# Patient Record
Sex: FEMALE | Race: OTHER | Employment: PART TIME | URBAN - NONMETROPOLITAN AREA
[De-identification: names, ages, dates, MRNs, and addresses within clinical notes are randomized per-mention and may not be internally consistent; named-entity substitution may affect disease eponyms.]

---

## 2017-10-02 ENCOUNTER — OFFICE VISIT (OUTPATIENT)
Dept: PRIMARY CARE CLINIC | Age: 22
End: 2017-10-02
Payer: COMMERCIAL

## 2017-10-02 VITALS
HEIGHT: 63 IN | DIASTOLIC BLOOD PRESSURE: 70 MMHG | BODY MASS INDEX: 22.32 KG/M2 | OXYGEN SATURATION: 95 % | WEIGHT: 126 LBS | HEART RATE: 59 BPM | TEMPERATURE: 99.2 F | SYSTOLIC BLOOD PRESSURE: 118 MMHG

## 2017-10-02 DIAGNOSIS — N64.4 BREAST TENDERNESS IN FEMALE: ICD-10-CM

## 2017-10-02 DIAGNOSIS — N92.6 ABNORMAL MENSES: ICD-10-CM

## 2017-10-02 DIAGNOSIS — R51.9 FREQUENT HEADACHES: Primary | ICD-10-CM

## 2017-10-02 DIAGNOSIS — Z23 NEED FOR INFLUENZA VACCINATION: ICD-10-CM

## 2017-10-02 DIAGNOSIS — N92.6 ABNORMAL MENSES: Primary | ICD-10-CM

## 2017-10-02 DIAGNOSIS — R53.83 FATIGUE, UNSPECIFIED TYPE: ICD-10-CM

## 2017-10-02 DIAGNOSIS — R11.0 NAUSEA: ICD-10-CM

## 2017-10-02 LAB
BILIRUBIN, POC: NORMAL
BLOOD URINE, POC: NORMAL
CLARITY, POC: CLEAR
COLOR, POC: YELLOW
CONTROL: NORMAL
GLUCOSE URINE, POC: NORMAL
KETONES, POC: NORMAL
LEUKOCYTE EST, POC: NORMAL
NITRITE, POC: NORMAL
PH, POC: 7
PREGNANCY TEST URINE, POC: NEGATIVE
PROTEIN, POC: NORMAL
SPECIFIC GRAVITY, POC: 1.01
UROBILINOGEN, POC: 0.2

## 2017-10-02 PROCEDURE — 90471 IMMUNIZATION ADMIN: CPT | Performed by: PEDIATRICS

## 2017-10-02 PROCEDURE — 90686 IIV4 VACC NO PRSV 0.5 ML IM: CPT | Performed by: PEDIATRICS

## 2017-10-02 PROCEDURE — 99204 OFFICE O/P NEW MOD 45 MIN: CPT | Performed by: PEDIATRICS

## 2017-10-02 PROCEDURE — 81025 URINE PREGNANCY TEST: CPT | Performed by: PEDIATRICS

## 2017-10-02 PROCEDURE — 81002 URINALYSIS NONAUTO W/O SCOPE: CPT | Performed by: PEDIATRICS

## 2017-10-02 ASSESSMENT — ENCOUNTER SYMPTOMS
DIARRHEA: 0
SINUS PRESSURE: 0
ABDOMINAL PAIN: 0
NAUSEA: 1
SHORTNESS OF BREATH: 0
BACK PAIN: 0
CHEST TIGHTNESS: 0
TROUBLE SWALLOWING: 0
VOMITING: 0

## 2017-10-02 NOTE — PROGRESS NOTES
[facility], [address]  Phone [phone]   Fax [phone]      OFFICE VISIT: 10/2/2017    Tyler Barrow- : 1995      HPI  Reason For Visit:  Kary Martines is a 25 y.o. here as new patient to establish care. Health Maintenance utd, flu vaccine- would like to have today. Date of Most Recent Physical:  2017, never had pap smear  Medicare Health Risk Assessment Form completed and in chart?  na    The patient presents today for new patient visit to establish care. migraine type headaches daily last month, currently not as severe. She was having migraines daily for a month. She has occasional nausea  This was back in august.  She has a headache about every other day  No double vision, but some difficulty focusing. Last period was 17  Typical period 30+ days. breast soreness and growing. No leaking from nipples. Not a runner    took pregnancy test and was negative and period followed. She had a light period that was light and spotty, then she had a couple days that were more normal  She does not take any medications     She has never had an mri           height is 5' 3\" (1.6 m) and weight is 126 lb (57.2 kg). Her temporal temperature is 99.2 °F (37.3 °C). Her blood pressure is 118/70 and her pulse is 59. Her oxygen saturation is 95%. Body mass index is 22.32 kg/(m^2). Results for orders placed or performed in visit on 10/02/17   POCT Urinalysis no Micro   Result Value Ref Range    Color, UA yellow     Clarity, UA clear     Glucose, UA POC neg     Bilirubin, UA neg     Ketones, UA neg     Spec Grav, UA 1.015     Blood, UA POC neg     pH, UA 7.0     Protein, UA POC neg     Urobilinogen, UA 0.2     Leukocytes, UA neg     Nitrite, UA neg    POCT urine pregnancy   Result Value Ref Range    Preg Test, Ur negative     Control         I have reviewed the following with the Ms. Maxwell   Lab Review No results found for any previous visit.   Copies of these are in the chart. No current outpatient prescriptions on file. No current facility-administered medications for this visit. Allergies: Review of patient's allergies indicates no known allergies. Past Medical History:   Diagnosis Date    GERD (gastroesophageal reflux disease)        History reviewed. No pertinent surgical history. Social History   Substance Use Topics    Smoking status: Never Smoker    Smokeless tobacco: Never Used    Alcohol use No       Review of Systems   Constitutional: Negative for appetite change and fever. HENT: Negative for congestion, ear pain, sinus pressure and trouble swallowing. Eyes: Negative for visual disturbance. Respiratory: Negative for chest tightness and shortness of breath. Cardiovascular: Negative for chest pain. Gastrointestinal: Positive for nausea. Negative for abdominal pain, diarrhea and vomiting. Endocrine: Negative for cold intolerance. Genitourinary: Negative for difficulty urinating and urgency. Musculoskeletal: Negative for back pain and neck pain. Skin: Negative for wound. Neurological: Positive for headaches. Negative for weakness and light-headedness. Psychiatric/Behavioral: Negative for sleep disturbance. Physical Exam   Constitutional: She is oriented to person, place, and time. She appears well-developed and well-nourished. She is cooperative. Non-toxic appearance. No distress. Body habitus is normal   HENT:   Head: Normocephalic and atraumatic. Right Ear: Hearing, tympanic membrane, external ear and ear canal normal.   Left Ear: Hearing, tympanic membrane, external ear and ear canal normal.   Nose: Nose normal.   Mouth/Throat: Oropharynx is clear and moist and mucous membranes are normal.   Eyes: Conjunctivae, EOM and lids are normal. Pupils are equal, round, and reactive to light. Neck: Phonation normal. Neck supple. No JVD present. Carotid bruit is not present. No thyromegaly present.

## 2017-10-02 NOTE — PROGRESS NOTES
After obtaining consent from EMMY Jade DO, gave patient fluzone injection in Left deltoid, patient tolerated well. Medication was not supplied by the patient.

## 2017-10-05 DIAGNOSIS — N64.4 BREAST TENDERNESS IN FEMALE: ICD-10-CM

## 2017-10-05 DIAGNOSIS — R53.83 FATIGUE, UNSPECIFIED TYPE: ICD-10-CM

## 2017-10-05 DIAGNOSIS — N92.6 ABNORMAL MENSES: ICD-10-CM

## 2017-10-05 LAB
FOLLICLE STIMULATING HORMONE: 6.3 MIU/ML
LUTEINIZING HORMONE: 4.5 MIU/ML
T4 FREE: 1.2 NG/DL (ref 0.9–1.7)
TSH SERPL DL<=0.05 MIU/L-ACNC: 1.3 UIU/ML (ref 0.27–4.2)

## 2017-10-07 LAB — PROLACTIN: 8.1 NG/ML (ref 2.8–26)

## 2017-10-09 ENCOUNTER — HOSPITAL ENCOUNTER (OUTPATIENT)
Dept: MRI IMAGING | Age: 22
Discharge: HOME | End: 2017-10-09
Payer: COMMERCIAL

## 2017-10-09 ENCOUNTER — PATIENT MESSAGE (OUTPATIENT)
Dept: PRIMARY CARE CLINIC | Age: 22
End: 2017-10-09

## 2017-10-09 DIAGNOSIS — R53.83 FATIGUE, UNSPECIFIED TYPE: ICD-10-CM

## 2017-10-09 DIAGNOSIS — E23.7 ABNORMALITY OF PITUITARY GLAND (HCC): Primary | ICD-10-CM

## 2017-10-09 DIAGNOSIS — N64.4 BREAST TENDERNESS IN FEMALE: ICD-10-CM

## 2017-10-09 DIAGNOSIS — N92.6 ABNORMAL MENSES: ICD-10-CM

## 2017-10-09 DIAGNOSIS — R51.9 FREQUENT HEADACHES: ICD-10-CM

## 2017-10-09 PROCEDURE — 70551 MRI BRAIN STEM W/O DYE: CPT

## 2017-10-10 ENCOUNTER — PATIENT MESSAGE (OUTPATIENT)
Dept: PRIMARY CARE CLINIC | Age: 22
End: 2017-10-10

## 2017-10-10 ENCOUNTER — TELEPHONE (OUTPATIENT)
Dept: PRIMARY CARE CLINIC | Age: 22
End: 2017-10-10

## 2017-10-10 NOTE — TELEPHONE ENCOUNTER
From: Jeremy Pryor  To: Daniela Roca DO  Sent: 10/10/2017 4:05 PM CDT  Subject: Test Results Question    Okay thank you.   ----- Message -----  From: Abebamohamud Floyds  Sent: 10/10/17, 4:59 PM  To: Pradeep Maxwell  Subject: RE: Test Results Question    Okay, I have put in the referral to their office for you. They will be contacting you for an appoitment, you are going to see Dr. eBrta Chaudhari. Let us know if you need anything else. Thanks,     Kira Allan MA    ----- Message -----   From: Dominique Maxwell   Sent: 10/10/2017 12:02 PM CDT   To: Fredrick Barger DO  Subject: RE: Test Results Question    Okay, I would like to get a neurosurgical referral. What is the process for that?     ----- Message -----  From: Zaheer Del Castillo  Sent: 10/10/17, 11:31 AM  To: Pradeep Maxwell  Subject: RE: Test Results Question    Notes Recorded by Daniela Roca DO on 10/9/2017 at 8:10 PM CDT  MRI of the pituitary gland does show a slightly vague abnormality in the posterior part of the pituitary gland. The radiologist was unclear as to whether this may be a small adenoma or just an artifact which is not important. Options:  -We can do a repeat MRI with contrast for better enhancement of this area. -We could hold off for now and evaluate further if any symptoms change, or if prolactin levels increase.  -We could put in a neurosurgical referral for their expert opinion as well    Let us know what you want to do further. Thanks,     Kira Allan MA    ----- Message -----   From: Dominique Maxwell   Sent: 10/9/2017 8:31 PM CDT   To: Frderick Barger DO  Subject: Test Results Question    I have a question about MRI BRAIN WO CONTRAST resulted on 10/9/17, 9:42 AM.    So, what do you recommend and where do we go from here? I'm a little confused.      Thank you,     Ysabel Verduzco

## 2017-10-10 NOTE — TELEPHONE ENCOUNTER
----- Message from [address], DO sent at 10/8/2017  8:26 AM CDT -----  Prolactin level is normal.  This is excellent news and indicates that everything should be okay. If there are any further problems, or if this continues, please let me know and we can do more studies. I'm very pleased, however, with these results. This indicates a very low likelihood of any pituitary process occurring. If you notice any redness, firmness, lumps or bumps of the breast tissue, please let me know. Also if you experience any swelling of lymph nodes under the armpit area, I will need to know.

## 2017-10-12 ENCOUNTER — TELEPHONE (OUTPATIENT)
Dept: NEUROSURGERY | Age: 22
End: 2017-10-12

## 2017-10-12 NOTE — TELEPHONE ENCOUNTER
Neurosurgical pre apt questionnaire     Referring physician? LIZETTE    Who is completing questionnaire? PATIENT     Has the pt had any previous spinal/brain surgeries? NO    If yes, Where was the surgery preformed? What was the surgery? Who was the surgeon? When was this surgery? Why is the pt not following up with previous surgeon? Is this a second opinion? Was a MRI preformed? YES    If not, Is there any reason a MRI cannot be performed? Is there metal anywhere in the body? If yes,  Where was the MRI preformed? [facility]   What part of the body? BRAIN   When was it preformed? 10/9/17      Note:If  was not the facility that performed the study,    the disc will need to be brought appoint. Physical Therapy? If yes, where was PT completed? What is the duration of therapy? Pain Management? NO   If yes, where was pain mgt therapy? Is the patient still under contract with pain mgt? Who is the pain mgt physician? Is the pt currently taking anything for pain control? Employment Status ? EMPLOYED   What type of employment? [employer]   Has the patient missed work due to pain? YES A COUPLE DAYS   If unemployed, how long? Are you on disability? Symptoms?         DAILY MIGRAINES, SHARP PAIN FROM HEAD INTO EYE, SHOOTING PAIN

## 2017-10-24 ENCOUNTER — OFFICE VISIT (OUTPATIENT)
Dept: NEUROSURGERY | Age: 22
End: 2017-10-24
Payer: COMMERCIAL

## 2017-10-24 VITALS
HEIGHT: 64 IN | DIASTOLIC BLOOD PRESSURE: 74 MMHG | BODY MASS INDEX: 21.68 KG/M2 | HEART RATE: 84 BPM | OXYGEN SATURATION: 100 % | WEIGHT: 127 LBS | SYSTOLIC BLOOD PRESSURE: 122 MMHG

## 2017-10-24 DIAGNOSIS — G43.719 INTRACTABLE CHRONIC MIGRAINE WITHOUT AURA AND WITHOUT STATUS MIGRAINOSUS: Primary | ICD-10-CM

## 2017-10-24 PROCEDURE — 99203 OFFICE O/P NEW LOW 30 MIN: CPT | Performed by: NURSE PRACTITIONER

## 2017-10-24 ASSESSMENT — ENCOUNTER SYMPTOMS
BLURRED VISION: 1
DIARRHEA: 0
BLOOD IN STOOL: 0
DOUBLE VISION: 0
ABDOMINAL PAIN: 0
VOMITING: 1
RESPIRATORY NEGATIVE: 1
NAUSEA: 1
SINUS PAIN: 0
EYE DISCHARGE: 0
SORE THROAT: 0
EYE PAIN: 0
STRIDOR: 0
BACK PAIN: 0
CONSTIPATION: 0
PHOTOPHOBIA: 1
HEARTBURN: 0
EYE REDNESS: 0

## 2017-10-24 NOTE — PROGRESS NOTES
[facility] Neurosurgery  Office Visit    Patient:   Yesenia Self  MR#:    [id number]      YOB: 1995  Date of Evaluation:  10/24/2017  Time of Note:                          9:05 AM  Primary/Referring Physician:  Munir Mena DO    Note Author:   Julio Hernandez CNP    Chief Complaint   Patient presents with    Other     migraines       HISTORY OF PRESENT ILLNESS:      Yesenia Self is a 25 y.o. female who presents with migraines and an abnormal MRI. She states that she has had migraines all through growing up but they have gotten worse since the middle of August.  They start in the occipital region of her head and move to the right side of her head and behind her eyes. She was seen by her PCP and a MRI of the brain was ordered, came back abnormal and was ultimately referred to us. Of note she does not use tobacco and does not take blood thinning medications. Past Medical History:   Diagnosis Date    GERD (gastroesophageal reflux disease)     Headache        History reviewed. No pertinent surgical history. Current Outpatient Prescriptions   Medication Sig Dispense Refill    Prenat-Fe Carbonyl-FA-Omega 3 (ONE-A-DAY WOMENS PRENATAL 1 PO) Take by mouth Daily       No current facility-administered medications for this visit. Allergies:  Review of patient's allergies indicates no known allergies. Social History:   History   Smoking Status    Never Smoker   Smokeless Tobacco    Never Used     History   Alcohol Use No         Family History:   Family History   Problem Relation Age of Onset    No Known Problems Mother     No Known Problems Father        REVIEW OF SYSTEMS:  Review of Systems   Constitutional: Negative. HENT: Positive for tinnitus. Negative for congestion, ear discharge, ear pain, hearing loss, nosebleeds, sinus pain and sore throat. Eyes: Positive for blurred vision and photophobia. Negative for double vision, pain, discharge and redness. Respiratory: Negative. Negative for stridor. Cardiovascular: Negative. Gastrointestinal: Positive for nausea and vomiting. Negative for abdominal pain, blood in stool, constipation, diarrhea, heartburn and melena. Genitourinary: Positive for frequency. Negative for dysuria, flank pain, hematuria and urgency. Musculoskeletal: Positive for myalgias. Negative for back pain, falls, joint pain and neck pain. Skin: Negative. Neurological: Positive for headaches. Negative for dizziness, tingling, tremors, sensory change, speech change, focal weakness, seizures and loss of consciousness. Endo/Heme/Allergies: Negative. Psychiatric/Behavioral: Negative. PHYSICAL EXAM:  Vitals:    10/24/17 0830   BP: 122/74   Pulse: 84   SpO2: 100%     Constitutional: appears well-developed and well-nourished. Eyes - conjunctiva normal.  Pupils react to light  Ear, nose, throat -hearing intact to finger rub, No scars, masses, or lesions over external nose or ears, no atrophy of tongue  Neck-symmetric, no masses noted, no jugular vein distension  Respiration- chest wall appears symmetric, good expansion, normal effort without use of accessory muscles  Musculoskeletal - no significant wasting of muscles noted, no bony deformities, gait no gross ataxia  Extremities-no clubbing, cyanosis or edema  Skin - warm, dry, and intact. No rash, erythema, or pallor. Psychiatric - mood, affect, and behavior appear normal.     Neurologic Examinaiton  Awake, Alert and oriented x 3  Normal speech pattern, following commands  Motor 5/5 all extremities  No deficits to light touch or pinprick sensation  Reflexes are 2+ and symmetric  No clonus or Hoffmans sign  Normal Gait pattern      DATA and IMAGING:    Nursing/pcp notes, imaging, labs, and vitals reviewed.      PT,OT and/or speech notes reviewed    No results found for: WBC, HGB, HCT, MCV, PLTNo results found for: NA, K, CL, CO2, BUN, CREATININE, GLUCOSE, CALCIUM, PROT, LABALBU, BILITOT, ALKPHOS, AST, ALT, LABGLOM, GFRAA, AGRATIO, GLOBNo results found for: INR, PROTIME      Narrative   Examination. MRI BRAIN WO CONTRAST   History: Frequent headaches and generalized weakness. The multiplanar multisequence MR imaging of the brain is performed   without intravenous contrast enhancement. There is no previous study for comparison. The diffusion-weighted imaging sequence including the ADC map show no   areas of restricted diffusion. The inferior pole of the cerebellar tonsils is marginally below the   plane of foramen magnum and probably represent a normal variation. The   remaining posterior fossa structures including the fourth ventricle   are normal.   There is no evidence of a mass or midline shift. The ventricles, the   cistern on the cortical sulci are normal.   There is an ill-defined low-density area along the posterior inferior   part of the pituitary gland. The corpus callosum, the brainstem and   the remaining cerebellum appear normal. The orbits bilaterally, the   optic nerves and optic chiasm appear normal. 7th and 8th nerve bundles   bilaterally appears normal and symmetrical.   The FLAIR sequence images show no signal abnormalities in the white   matter. The gradient recalled imaging sequence show normal flow in the   visualized intracranial vessels. There is no evidence of intracranial   hemorrhage or hematoma. The visualized paranasal sinuses and mastoid air cells bilaterally   appear normal and symmetrical.       Impression   An ill-defined small low-density area in the posterior   medial part of the pituitary gland may represent a microadenoma are an   imaging artifact. If clinically warranted, please obtain contrast   enhancement and mild imaging of the pituitary gland. A mild tonsillar ectopy may represent a normal variation. No other intracranial abnormality noted.    Signed by Dr Constantin Blood on 10/9/2017 8:42 AM     I have personally reviewed these images and my interpretation is: This is an unremarkable MRI of the brain. There is a normal bright spot on the pituitary gland, this is a normal finding. ASSESSMENT:    This is a 25 y.o. female who presents with a history of migraines. Her MRI of the brain is unremarkable. ICD-10-CM ICD-9-CM    1. Intractable chronic migraine without aura and without status migrainosus G43.719 346.71 Ambulatory referral to Neurology       PLAN:  -Explained and discussed the results/findings of the MRI brain. We explained that the spot that was read to be \"bright\" is a normal bright spot that typically appears on the posterior aspect of the pituitary gland, this is a normal finding.    -Referral to Dr. Shi Montalvo.  Teodora Schwab for management of migraines  -Follow up as needed       Chano Loving, CNP

## 2017-12-12 ENCOUNTER — OFFICE VISIT (OUTPATIENT)
Dept: NEUROSURGERY | Age: 22
End: 2017-12-12
Payer: COMMERCIAL

## 2017-12-12 VITALS
TEMPERATURE: 98.2 F | OXYGEN SATURATION: 100 % | SYSTOLIC BLOOD PRESSURE: 131 MMHG | HEIGHT: 64 IN | DIASTOLIC BLOOD PRESSURE: 70 MMHG | WEIGHT: 127 LBS | RESPIRATION RATE: 18 BRPM | BODY MASS INDEX: 21.68 KG/M2 | HEART RATE: 92 BPM

## 2017-12-12 DIAGNOSIS — G43.719 INTRACTABLE CHRONIC MIGRAINE WITHOUT AURA AND WITHOUT STATUS MIGRAINOSUS: Primary | ICD-10-CM

## 2017-12-12 PROCEDURE — 99204 OFFICE O/P NEW MOD 45 MIN: CPT | Performed by: PSYCHIATRY & NEUROLOGY

## 2017-12-12 RX ORDER — RIZATRIPTAN BENZOATE 10 MG/1
10 TABLET ORAL
Qty: 9 TABLET | Refills: 5 | Status: SHIPPED | OUTPATIENT
Start: 2017-12-12 | End: 2018-03-13

## 2017-12-12 RX ORDER — TOPIRAMATE 50 MG/1
50 TABLET, FILM COATED ORAL 2 TIMES DAILY
Qty: 60 TABLET | Refills: 5 | Status: SHIPPED | OUTPATIENT
Start: 2017-12-12 | End: 2018-03-13

## 2017-12-12 NOTE — PROGRESS NOTES
[address] [facility] Neurology Office Note      Patient:   Caity Lancaster  MR#:    [id number]  Account Number:                         YOB: 1995  Date of Evaluation:  12/12/2017  Time of Note:                          11:06 AM  Primary/Referring Physician:  Kelly Nicole DO   Consulting Physician:  Jim Crandall D.O.    NEW PATIENT CONSULTATION    Chief Complaint   Patient presents with    Migraine     Patient presents for intractable chronic migraine without aura and without status migrainosus. HISTORY OF PRESENT ILLNESS    Caity Lancaster is a 25y.o. year old female here for headaches. Present for quite sometime, worsening over the last 6 months, much more frequent. Pain is typically posterior, will migrate forward, notes some tingling in the scalp at times, some photophobia, some nausea, some blurred vision. Frequency is almost daily. Does take OTC pain medications daily. MRI brain with questionable pituitary gland abnormality, mild tonsillar ectopy noted. Seen by neurosurgery and not felt to be anything significant. Past Medical History:   Diagnosis Date    GERD (gastroesophageal reflux disease)     Headache        History reviewed. No pertinent surgical history. Family History   Problem Relation Age of Onset    No Known Problems Mother     No Known Problems Father        Social History     Social History    Marital status:      Spouse name: N/A    Number of children: N/A    Years of education: N/A     Occupational History    Not on file.      Social History Main Topics    Smoking status: Never Smoker    Smokeless tobacco: Never Used    Alcohol use No    Drug use: No    Sexual activity: Yes     Partners: Male     Other Topics Concern    Not on file     Social History Narrative    No narrative on file       Current Outpatient Prescriptions   Medication Sig Dispense Refill    Prenat-Fe Carbonyl-FA-Omega 3 (ONE-A-DAY WOMENS PRENATAL 1 PO) Take by mouth Daily       No marked  Allergic/Immunologic:[] Environmental Allergies [] Food Allergies [] Immunocompromised state  [x] Denies all unless marked    PHYSICAL EXAM  /70   Pulse 92   Temp 98.2 °F (36.8 °C)   Resp 18   Ht 5' 4\" (1.626 m)   Wt 127 lb (57.6 kg)   SpO2 100%   BMI 21.80 kg/m²     Constitutional - No acute distress    HEENT- Conjunctiva normal.  No scars, masses, or lesions over external nose or ears  Musculoskeletal - No significant wasting of muscles noted, no bony deformities  Extremities - No clubbing, cyanosis or edema  Skin - Warm, dry, and intact. No rash, erythema, or pallor  Psychiatric - Mood, affect, and behavior appear normal      NEUROLOGICAL EXAM    Mental status   [x]Awake, alert, oriented   [x]Affect attention and concentration appear appropriate  [x]Recent and remote memory appears unremarkable  [x]Speech normal without dysarthria or aphasia, comprehension and repetition intact. COMMENTS:    Cranial Nerves [x]No VF deficit to confrontation,  no papilledema on fundoscopic exam.  [x]PERRLA, EOMI, no nystagmus, conjugate eye movements, no ptosis  [x]Face symmetric  [x]Facial sensation intact  [x]Tongue midline no atrophy or fasciculations present  [x]Palate midline, hearing to finger rub normal bilaterally  [x]Shoulder shrug and SCM testing normal bilaterally  COMMENTS:   Motor   [x]5/5 strength x 4 extremities  [x]Normal bulk and tone  [x]No tremor present  [x]No rigidity or bradykinesia noted  COMMENTS:   Sensory  [x]Sensation intact to light touch, pin prick, vibration, and proprioception BLE  [x]Sensation intact to light touch, pin prick, vibration, and proprioception BUE  COMMENTS:   Coordination [x]FTN normal bilaterally   [x]HTS normal bilaterally  [x]MEGHANA normal bilaterally.    COMMENTS:   Reflexes  [x]Symmetric and non-pathological  [x]Toes down going bilaterally  [x]No clonus present  COMMENTS:   Gait                  [x]Normal steady gait    []Ataxic    []Spastic     []Magnetic 10/9/2017 8:42 AM      ASSESSMENT:    Yesenia Self is a 25y.o. year old female here for headaches. Suspect migraine given history. 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 Recent pituitary labs were normal. Neurosurgery has evaluated. PLAN:  1. Topamax titrate to 50 mg bid, side effects including renal stones discussed. 2.  Maxalt prn.   3.  Limit OTC pain medications  4. Headache log  5. Repeat MRI in 4-6 months to reassess pituitary and mild chiari.       Alvaro Redding DO  Board Certified Neurology

## 2017-12-12 NOTE — PROGRESS NOTES
[facility] Neurology Office Note      Patient:   Caity Lancaster  MR#:    [id number]  Account Number:                         YOB: 1995  Date of Evaluation:  12/12/2017  Time of Note:                          10:58 AM  Primary/Referring Physician:  Kelly Nicole DO   Consulting Physician:  Jim Crandall D.O.    NEW PATIENT CONSULTATION    OR    FOLLOW UP    Chief Complaint   Patient presents with    Migraine     Patient presents for intractable chronic migraine without aura and without status migrainosus. HISTORY OF PRESENT ILLNESS    Caity Lancaster is a 25y.o. year old female here for ***    Past Medical History:   Diagnosis Date    GERD (gastroesophageal reflux disease)     Headache        No past surgical history on file. Family History   Problem Relation Age of Onset    No Known Problems Mother     No Known Problems Father        Social History     Social History    Marital status:      Spouse name: N/A    Number of children: N/A    Years of education: N/A     Occupational History    Not on file. Social History Main Topics    Smoking status: Never Smoker    Smokeless tobacco: Never Used    Alcohol use No    Drug use: No    Sexual activity: Yes     Partners: Male     Other Topics Concern    Not on file     Social History Narrative    No narrative on file       Current Outpatient Prescriptions   Medication Sig Dispense Refill    Prenat-Fe Carbonyl-FA-Omega 3 (ONE-A-DAY WOMENS PRENATAL 1 PO) Take by mouth Daily       No current facility-administered medications for this visit.         No Known Allergies      REVIEW OF SYSTEMS    Constitutional: []Fever []Sweats []Chills [] Recent Injury []Fatigue  [x] Denies all unless marked  HEENT:[x]Headache  [] Head Injury  [] Sore Throat  [x] Ear Pain  [x]Dizziness [] Hearing Loss []Trouble Swallowing []Voice Change  [] Eye Pain  [] Eye Injection []Visual Disturbance  [] Ptosis  [] Tinnitus [] Denies all unless marked  Spine: use of accessory muscles  Musculoskeletal  No significant wasting of muscles noted, no bony deformities  Extremities - No clubbing, cyanosis or edema  Skin  Warm, dry, and intact. No rash, erythema, or pallor  Psychiatric  Mood, affect, and behavior appear normal      NEUROLOGICAL EXAM    Mental status   [x]Awake, alert, oriented   [x]Affect attention and concentration appear appropriate  [x]Recent and remote memory appears unremarkable  [x]Speech normal without dysarthria or aphasia, comprehension and repetition intact. COMMENTS:***    Cranial Nerves [x]No VF deficit to confrontation,  no papilledema on fundoscopic exam.  [x]PERRLA, EOMI, no nystagmus, conjugate eye movements, no ptosis  [x]Face symmetric  [x]Facial sensation intact  [x]Tongue midline no atrophy or fasciculations present  [x]Palate midline, hearing to finger rub normal bilaterally  [x]Shoulder shrug and SCM testing normal bilaterally  COMMENTS:***   Motor   [x]5/5 strength x 4 extremities  [x]Normal bulk and tone  [x]No tremor present  [x]No rigidity or bradykinesia noted  COMMENTS:***   Sensory  [x]Sensation intact to light touch, pin prick, vibration, and proprioception BLE  [x]Sensation intact to light touch, pin prick, vibration, and proprioception BUE  COMMENTS:***   Coordination [x]FTN normal bilaterally   [x]HTS normal bilaterally  [x]MEGHANA normal bilaterally.    COMMENTS:***   Reflexes  [x]Symmetric and non-pathological  [x]Toes down going bilaterally  [x]No clonus present  COMMENTS:***   Gait                  [x]Normal steady gait    []Ataxic    []Spastic     []Magnetic     []Shuffling  COMMENTS:***       LABS RECORD AND IMAGING REVIEW (As below and per HPI)    No results found for: [id number]  No results found for: WBC, HGB, HCT, MCV, PLT  No results found for: NA, K, CL, CO2, BUN, CREATININE, GLUCOSE, CALCIUM, PROT, LABALBU, BILITOT, ALKPHOS, AST, ALT, LABGLOM, GFRAA, AGRATIO, GLOB    Mri Brain Wo Contrast    Result Date:

## 2017-12-14 ENCOUNTER — OFFICE VISIT (OUTPATIENT)
Dept: PRIMARY CARE CLINIC | Age: 22
End: 2017-12-14
Payer: COMMERCIAL

## 2017-12-14 VITALS
OXYGEN SATURATION: 98 % | HEIGHT: 64 IN | SYSTOLIC BLOOD PRESSURE: 98 MMHG | TEMPERATURE: 98 F | WEIGHT: 129 LBS | DIASTOLIC BLOOD PRESSURE: 66 MMHG | HEART RATE: 79 BPM | BODY MASS INDEX: 22.02 KG/M2

## 2017-12-14 DIAGNOSIS — N64.4 BREAST TENDERNESS: ICD-10-CM

## 2017-12-14 DIAGNOSIS — Z02.89 ENCOUNTER FOR PHYSICAL EXAMINATION RELATED TO EMPLOYMENT: Primary | ICD-10-CM

## 2017-12-14 DIAGNOSIS — N92.6 IRREGULAR PERIODS: ICD-10-CM

## 2017-12-14 PROCEDURE — 99213 OFFICE O/P EST LOW 20 MIN: CPT | Performed by: NURSE PRACTITIONER

## 2017-12-14 ASSESSMENT — ENCOUNTER SYMPTOMS
RHINORRHEA: 0
EYE REDNESS: 0
CONSTIPATION: 0
SORE THROAT: 0
ABDOMINAL PAIN: 0
TROUBLE SWALLOWING: 0
EYE DISCHARGE: 0
COUGH: 0
BLOOD IN STOOL: 0
WHEEZING: 0
DIARRHEA: 0

## 2017-12-14 NOTE — PROGRESS NOTES
Eyes: Negative for discharge and redness. Respiratory: Negative for cough and wheezing. Cardiovascular: Negative for chest pain. Gastrointestinal: Negative for abdominal pain, blood in stool, constipation and diarrhea. Genitourinary: Positive for menstrual problem. Negative for dysuria. Skin: Negative for rash. Neurological: Negative for weakness. Hematological: Negative for adenopathy. Objective:     Physical Exam   Constitutional: She is oriented to person, place, and time. She appears well-developed and well-nourished. HENT:   Head: Normocephalic. Right Ear: Tympanic membrane normal.   Left Ear: Tympanic membrane normal.   Eyes: Conjunctivae are normal.   Neck: Normal range of motion. Neck supple. Cardiovascular: Normal rate, regular rhythm and normal heart sounds. No murmur heard. Pulmonary/Chest: Effort normal and breath sounds normal.   Abdominal: Soft. Bowel sounds are normal. There is no tenderness. Musculoskeletal: Normal range of motion. Neurological: She is alert and oriented to person, place, and time. Skin: Skin is warm and dry. Psychiatric: Her behavior is normal.   Vitals reviewed. BP 98/66   Pulse 79   Temp 98 °F (36.7 °C) (Temporal)   Ht 5' 4\" (1.626 m)   Wt 129 lb (58.5 kg)   LMP 12/08/2017   SpO2 98%   BMI 22.14 kg/m²     Assessment:        ICD-10-CM ICD-9-CM    1. Encounter for physical examination related to employment Z02.1 V70.5    2. Irregular periods N92.6 626.4 Claudette BEVERLY/ATUL Archibald MD   3. Breast tenderness N64.4 611.71 Claudette BEVERLY/ATUL Archibald MD       Plan:      No Follow-up on file.     Orders Placed This Encounter   Procedures   [facility]     Referral Priority:   Routine     Referral Type:   Consult for Advice and Opinion     Referral Reason:   Specialty Services Required     Referred to Provider:   Tiburcio Boswell MD     Requested Specialty:   Obstetrics & Gynecology     Number of Visits

## 2018-01-18 ENCOUNTER — OFFICE VISIT (OUTPATIENT)
Dept: OBGYN | Age: 23
End: 2018-01-18
Payer: COMMERCIAL

## 2018-01-18 VITALS
HEIGHT: 64 IN | SYSTOLIC BLOOD PRESSURE: 116 MMHG | BODY MASS INDEX: 22.2 KG/M2 | DIASTOLIC BLOOD PRESSURE: 76 MMHG | HEART RATE: 76 BPM | WEIGHT: 130 LBS

## 2018-01-18 DIAGNOSIS — R10.9 ABDOMINAL CRAMPING: ICD-10-CM

## 2018-01-18 DIAGNOSIS — N92.6 IRREGULAR PERIODS: Primary | ICD-10-CM

## 2018-01-18 DIAGNOSIS — G43.019 INTRACTABLE MIGRAINE WITHOUT AURA AND WITHOUT STATUS MIGRAINOSUS: ICD-10-CM

## 2018-01-18 DIAGNOSIS — Z12.4 SCREENING FOR CERVICAL CANCER: ICD-10-CM

## 2018-01-18 DIAGNOSIS — R45.86 MOOD SWINGS: ICD-10-CM

## 2018-01-18 DIAGNOSIS — R11.0 NAUSEA: ICD-10-CM

## 2018-01-18 PROCEDURE — 99203 OFFICE O/P NEW LOW 30 MIN: CPT | Performed by: NURSE PRACTITIONER

## 2018-01-18 ASSESSMENT — ENCOUNTER SYMPTOMS
EYES NEGATIVE: 1
ALLERGIC/IMMUNOLOGIC NEGATIVE: 1
RESPIRATORY NEGATIVE: 1
GASTROINTESTINAL NEGATIVE: 1

## 2018-01-18 NOTE — PROGRESS NOTES
Subjective:      Patient ID: Cruzito Galaviz is a 26 y.o. female. HPI  Started in August having issues with migraines, nausea, mood swings, irregular periods, and abdominal cramping. Was seen by Dr. Trinh Murillo neurologist and was sent to us for irregular periods. Is trying for pregnancy per patient. Has never taken BCP. Needs pap today. Cruzito Galaviz is a 26 y.o. female with the following history as recorded in [facility]: There are no active problems to display for this patient. Current Outpatient Prescriptions   Medication Sig Dispense Refill    topiramate (TOPAMAX) 50 MG tablet Take 1 tablet by mouth 2 times daily 60 tablet 5    rizatriptan (MAXALT) 10 MG tablet Take 1 tablet by mouth once as needed for Migraine May repeat in 2 hours if needed 9 tablet 5    Prenat-Fe Carbonyl-FA-Omega 3 (ONE-A-DAY WOMENS PRENATAL 1 PO) Take by mouth Daily       No current facility-administered medications for this visit. Allergies: Review of patient's allergies indicates no known allergies. Past Medical History:   Diagnosis Date    GERD (gastroesophageal reflux disease)     Headache      History reviewed. No pertinent surgical history. Family History   Problem Relation Age of Onset    No Known Problems Mother     No Known Problems Father      Social History   Substance Use Topics    Smoking status: Never Smoker    Smokeless tobacco: Never Used    Alcohol use No         Review of Systems   Constitutional: Negative. HENT: Negative. Eyes: Negative. Respiratory: Negative. Cardiovascular: Negative. Gastrointestinal: Negative. Endocrine: Negative. Genitourinary: Positive for menstrual problem (States for the last few months her periods has been irregular; also c/o mood swings, headaches, and nausea). Musculoskeletal: Negative. Skin: Negative. Allergic/Immunologic: Negative. Neurological: Negative. Hematological: Negative. Psychiatric/Behavioral: Negative.         Objective:

## 2018-01-18 NOTE — PATIENT INSTRUCTIONS
Patient Education        Learning About Natural Family Planning  What is natural family planning? Natural family planning is a way to find out which days of the month you are most likely to get pregnant. To prevent pregnancy, you do not have sex on those days. If you want to get pregnant, you have sex during those days. But a woman may use natural family planning and still not get the results she wants. This method may not work well for you if your periods are not regular. It also may not work well if you have problems keeping track of your periods or taking your temperature at the same time each day. How well does it work as birth control? Family planning takes a lot of effort. You must be very aware of your body. And you must track many things closely. It can be very hard to do \"exactly as directed. \" This type of family planning does not work better than other birth control methods. In the first year of use:  · When natural family planning is used exactly as directed, 5 women out of 100 have an unplanned pregnancy. · When it is not used exactly as directed, 24 women out of 100 have an unplanned pregnancy. How do you find out when you are likely to get pregnant? A woman who has regular periods has about 5 to 9 fertile days each month. These are the days when she can get pregnant. To find out when you are fertile, you must know when you release an egg (ovulate). There are several ways to find out when you are fertile. To get the result you want, you may need to use some of these methods at the same time. You should check your body changes using these methods for several months before you use them to avoid pregnancy. · In the calendar, or rhythm method, you write down when you start your period. This tells you how long your cycle is. It also tells you how regular it is. With this information, you can guess which days of the month you are most likely to be fertile.  This is between 9 and 17 days before just had a baby, or stop taking birth control pills. It also may not work well just before menopause. · You must pay attention to body changes and record all details. · You can't use the method until you've tracked body changes for a few months. Where can you learn more? Go to [email or web address] and sign in to your [facility] account. Enter A452 in the [employer] box to learn more about \"Learning About [facility]. \"     If you do not have an account, please click on the \"Sign Up Now\" link. Current as of: March 16, 2017  Content Version: 11.5  © 3850-4855 [employer], [employer]. Care instructions adapted under license by [facility] [place] ([facility]). If you have questions about a medical condition or this instruction, always ask your healthcare professional. [address] any warranty or liability for your use of this information.

## 2018-01-22 ENCOUNTER — HOSPITAL ENCOUNTER (OUTPATIENT)
Dept: ULTRASOUND IMAGING | Age: 23
Discharge: HOME | End: 2018-01-22
Payer: COMMERCIAL

## 2018-01-22 DIAGNOSIS — R10.9 ABDOMINAL CRAMPING: ICD-10-CM

## 2018-01-22 DIAGNOSIS — N92.6 IRREGULAR PERIODS: ICD-10-CM

## 2018-01-22 PROCEDURE — 76856 US EXAM PELVIC COMPLETE: CPT

## 2018-03-13 ENCOUNTER — OFFICE VISIT (OUTPATIENT)
Dept: NEUROSURGERY | Age: 23
End: 2018-03-13
Payer: COMMERCIAL

## 2018-03-13 VITALS
DIASTOLIC BLOOD PRESSURE: 70 MMHG | BODY MASS INDEX: 22.2 KG/M2 | SYSTOLIC BLOOD PRESSURE: 106 MMHG | HEIGHT: 64 IN | WEIGHT: 130 LBS

## 2018-03-13 DIAGNOSIS — G43.719 INTRACTABLE CHRONIC MIGRAINE WITHOUT AURA AND WITHOUT STATUS MIGRAINOSUS: Primary | ICD-10-CM

## 2018-03-13 PROCEDURE — 99214 OFFICE O/P EST MOD 30 MIN: CPT | Performed by: PSYCHIATRY & NEUROLOGY

## 2018-03-13 NOTE — PROGRESS NOTES
[facility] Neurology Office Note      Patient:   Yarelis Crandall  MR#:    [id number]  Account Number:                         YOB: 1995  Date of Evaluation:  3/13/2018  Time of Note:                          11:11 AM  Primary/Referring Physician:  Adriana Sullivan DO   Consulting Physician:  Angela Cruz D.O.    FOLLOW UP VISIT    Chief Complaint   Patient presents with    Migraine     i am doing the same and the 2 new medications i was given did not help       HISTORY OF PRESENT ILLNESS    Yarelis Crandall is a 26y.o. year old female here for headaches. Present for quite sometime, worsening over the past few months. Tried on Topamax and Maxalt and did not help or tolerate. Still about the same from a frequency standpoint. Pain is typically posterior, will migrate forward, notes some tingling in the scalp at times, some photophobia, some nausea, some blurred vision. Frequency is still almost daily. Does start anteriorly at times. Does take OTC pain medications. MRI brain with questionable pituitary gland abnormality, mild tonsillar ectopy noted. Seen by neurosurgery and not felt to be anything significant. No other complaints today. Past Medical History:   Diagnosis Date    GERD (gastroesophageal reflux disease)     Headache        History reviewed. No pertinent surgical history. Family History   Problem Relation Age of Onset    No Known Problems Mother     No Known Problems Father        Social History     Social History    Marital status:      Spouse name: N/A    Number of children: N/A    Years of education: N/A     Occupational History    Not on file.      Social History Main Topics    Smoking status: Never Smoker    Smokeless tobacco: Never Used    Alcohol use No    Drug use: No    Sexual activity: Yes     Partners: Male     Other Topics Concern    Not on file     Social History Narrative    No narrative on file       Current Outpatient Prescriptions steady gait    []Ataxic    []Spastic     []Magnetic     []Shuffling  COMMENTS:       LABS RECORD AND IMAGING REVIEW (As below and per HPI)    MRI as per HPI. Prior pituitary labs normal including prolactin. ASSESSMENT:    Nader Adamson is a 26y.o. year old female here for headaches. Suspect migraine given history. 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 Recent pituitary labs were normal. Neurosurgery has evaluated. Has not improved since last seen, did not tolerate Topamax. Patient is currently trying to become pregnant. PLAN:  1. Stop Topamax. Will be limited from a preventative standpoint. 2.  Stop Maxalt. 3.  Tylenol prn. Limit OTC pain medications. No NSAIDs. 4.  Headache log  5.   Repeat MRI to reassess pituitary and mild chiari, will do without contrast.      Jonatan Shahid DO  Board Certified Neurology

## 2018-03-14 ENCOUNTER — TELEPHONE (OUTPATIENT)
Dept: NEUROLOGY | Age: 23
End: 2018-03-14

## 2018-03-20 ENCOUNTER — HOSPITAL ENCOUNTER (OUTPATIENT)
Dept: MRI IMAGING | Age: 23
Discharge: HOME | End: 2018-03-20
Payer: COMMERCIAL

## 2018-03-20 DIAGNOSIS — G43.719 INTRACTABLE CHRONIC MIGRAINE WITHOUT AURA AND WITHOUT STATUS MIGRAINOSUS: ICD-10-CM

## 2018-03-20 PROCEDURE — 70551 MRI BRAIN STEM W/O DYE: CPT
